# Patient Record
Sex: FEMALE | HISPANIC OR LATINO | URBAN - NONMETROPOLITAN AREA
[De-identification: names, ages, dates, MRNs, and addresses within clinical notes are randomized per-mention and may not be internally consistent; named-entity substitution may affect disease eponyms.]

---
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
## 2023-07-12 ENCOUNTER — OFFICE VISIT (OUTPATIENT)
Dept: URGENT CARE | Facility: PHYSICIAN GROUP | Age: 23
End: 2023-07-12
Payer: COMMERCIAL

## 2023-07-12 VITALS
BODY MASS INDEX: 21.6 KG/M2 | TEMPERATURE: 97.3 F | SYSTOLIC BLOOD PRESSURE: 98 MMHG | HEIGHT: 60 IN | WEIGHT: 110 LBS | HEART RATE: 77 BPM | DIASTOLIC BLOOD PRESSURE: 60 MMHG | OXYGEN SATURATION: 99 % | RESPIRATION RATE: 14 BRPM

## 2023-07-12 DIAGNOSIS — R21 RASH AND NONSPECIFIC SKIN ERUPTION: ICD-10-CM

## 2023-07-12 PROCEDURE — 99203 OFFICE O/P NEW LOW 30 MIN: CPT | Performed by: NURSE PRACTITIONER

## 2023-07-12 PROCEDURE — 3078F DIAST BP <80 MM HG: CPT | Performed by: NURSE PRACTITIONER

## 2023-07-12 PROCEDURE — 3074F SYST BP LT 130 MM HG: CPT | Performed by: NURSE PRACTITIONER

## 2023-07-12 ASSESSMENT — VISUAL ACUITY: OU: 1

## 2023-07-12 ASSESSMENT — ENCOUNTER SYMPTOMS
CHILLS: 0
RESPIRATORY NEGATIVE: 1
NEUROLOGICAL NEGATIVE: 1
CONSTITUTIONAL NEGATIVE: 1
MUSCULOSKELETAL NEGATIVE: 1
FEVER: 0
CARDIOVASCULAR NEGATIVE: 1

## 2023-07-12 NOTE — PROGRESS NOTES
Subjective:     Eliza Elam is a 23 y.o. female who presents for Rash (X3 weeks Rash on Feet hands and thighs, looks like dark spots, no swelling or itching, )       Rash  This is a new problem. The problem has been gradually worsening since onset. Pertinent negatives include no fever.     3 weeks ago, patient started to develop brown spots on her skin at her right hand. Eventually developed similar symptoms at the left hand, at the top of both feet, and between her thighs. Lesions are flat and not raised, itchy, or painful. Denies excessive sun exposure. Was swimming in [place] lake for one day. Otherwise, denies other exposure. Denies fever, chills, or other symptoms. Inquiring about anemia.    Review of Systems   Constitutional: Negative.  Negative for chills, fever and malaise/fatigue.   Respiratory: Negative.     Cardiovascular: Negative.    Musculoskeletal: Negative.    Skin:  Positive for rash. Negative for itching.   Neurological: Negative.    All other systems reviewed and are negative.    Refer to HPI for additional details.    During this visit, appropriate PPE was worn, and hand hygiene was performed.    PMH:  has a past medical history of Healthy pediatric patient.    MEDS: No current outpatient medications on file.    ALLERGIES: No Known Allergies  SURGHX: History reviewed. No pertinent surgical history.  SOCHX:  reports that she has never smoked. She has never used smokeless tobacco. She reports that she does not drink alcohol and does not use drugs.    FH: Per HPI as applicable/pertinent.      Objective:     BP 98/60   Pulse 77   Temp 36.3 °C (97.3 °F) (Temporal)   Resp 14   Ht 1.524 m (5')   Wt 49.9 kg (110 lb)   LMP 07/12/2023   SpO2 99%   BMI 21.48 kg/m²     Physical Exam  Nursing note reviewed.   Constitutional:       General: She is not in acute distress.     Appearance: She is well-developed. She is not ill-appearing or toxic-appearing.   Eyes:      General: Vision grossly  intact. No scleral icterus.  Cardiovascular:      Rate and Rhythm: Normal rate.   Pulmonary:      Effort: Pulmonary effort is normal. No respiratory distress.   Musculoskeletal:         General: No deformity. Normal range of motion.   Skin:     General: Skin is warm and dry.      Coloration: Skin is not cyanotic, jaundiced or pale.      Findings: Rash present. No bruising or erythema. Rash is macular. Rash is not crusting, papular, pustular, scaling, urticarial or vesicular.      Comments: Brown, macular lesions at dorsum of right hand, less at left hand, few on dorsal feet   Neurological:      Mental Status: She is alert and oriented to person, place, and time.      Motor: No weakness.   Psychiatric:         Behavior: Behavior normal. Behavior is cooperative.       Assessment/Plan:     1. Rash and nonspecific skin eruption  - CBC WITH DIFFERENTIAL; Future  - Comp Metabolic Panel; Future  - Referral to Dermatology    Unclear etiology for symptoms. Does not appear allergic or infectious. Considerations include hyperpigmentation, however, patient denies excessive sun exposure and was [place] in at the [place] just one day. Lesions only affect her hands, feet, and inner thighs. Basic labs pending to further evaluate. Vital signs stable, afebrile, no acute distress at this time. Advised watchful waiting for now. Follow up with dermatology for definitive diagnosis and treatment if symptoms persist; referral placed.    Differential diagnosis, natural history, supportive care, over-the-counter symptom management per 's instructions, close monitoring, and indications for immediate follow-up discussed.     All questions answered. Patient agrees with the plan of care.